# Patient Record
Sex: MALE | Race: WHITE | NOT HISPANIC OR LATINO | URBAN - METROPOLITAN AREA
[De-identification: names, ages, dates, MRNs, and addresses within clinical notes are randomized per-mention and may not be internally consistent; named-entity substitution may affect disease eponyms.]

---

## 2024-10-26 ENCOUNTER — OFFICE VISIT (OUTPATIENT)
Dept: URGENT CARE | Age: 39
End: 2024-10-26
Payer: COMMERCIAL

## 2024-10-26 ENCOUNTER — ANCILLARY PROCEDURE (OUTPATIENT)
Dept: URGENT CARE | Age: 39
End: 2024-10-26
Payer: COMMERCIAL

## 2024-10-26 VITALS
HEIGHT: 71 IN | SYSTOLIC BLOOD PRESSURE: 136 MMHG | HEART RATE: 68 BPM | RESPIRATION RATE: 20 BRPM | OXYGEN SATURATION: 99 % | WEIGHT: 155 LBS | BODY MASS INDEX: 21.7 KG/M2 | TEMPERATURE: 97.7 F | DIASTOLIC BLOOD PRESSURE: 82 MMHG

## 2024-10-26 DIAGNOSIS — S60.022A CONTUSION OF LEFT INDEX FINGER WITHOUT DAMAGE TO NAIL, INITIAL ENCOUNTER: Primary | ICD-10-CM

## 2024-10-26 DIAGNOSIS — M79.645 FINGER PAIN, LEFT: ICD-10-CM

## 2024-10-26 DIAGNOSIS — S61.211A LACERATION OF LEFT INDEX FINGER WITHOUT FOREIGN BODY WITHOUT DAMAGE TO NAIL, INITIAL ENCOUNTER: ICD-10-CM

## 2024-10-26 PROCEDURE — 73140 X-RAY EXAM OF FINGER(S): CPT | Mod: LEFT SIDE

## 2024-10-26 RX ORDER — MUPIROCIN 20 MG/G
OINTMENT TOPICAL
Qty: 22 G | Refills: 0 | Status: SHIPPED | OUTPATIENT
Start: 2024-10-26 | End: 2024-11-05

## 2024-10-26 RX ORDER — BACITRACIN ZINC 500 UNIT/G
1 OINTMENT IN PACKET (EA) TOPICAL ONCE
Status: COMPLETED | OUTPATIENT
Start: 2024-10-26 | End: 2024-10-26

## 2024-10-26 ASSESSMENT — ENCOUNTER SYMPTOMS
JOINT SWELLING: 1
WOUND: 1
COLOR CHANGE: 1
ARTHRALGIAS: 0

## 2024-10-26 ASSESSMENT — PAIN SCALES - GENERAL: PAINLEVEL_OUTOF10: 10-WORST PAIN EVER

## 2024-10-26 NOTE — PATIENT INSTRUCTIONS
RICE  NSAID/Tylenol as needed  Advised on healing time for injury  Avoid any activity that will worsen pain  Antibiotic ointment twice daily  Advisable that you update you tetanus immunization

## 2024-10-26 NOTE — PROGRESS NOTES
"Subjective   Patient ID: Jaycob Coleman is a 39 y.o. male. They present today with a chief complaint of Injury (Patient injured his left index finger 10/24/24.  It was hit with a rubber mallet.).    History of Present Illness  Subjective  Jaycob Coleman is a 39 y.o. male who presents for evaluation of left hand/finger pain. Onset was sudden, related to being struck by object. Mechanism of injury: blow. The pain is moderate, worsens with movement, and is relieved by rest. There is no associated numbness, tingling, weakness in left index finger. Evaluation to date: none. Treatment to date: OTC analgesics.    Objective  /82 (BP Location: Right arm, Patient Position: Sitting)   Pulse 68   Temp 36.5 °C (97.7 °F)   Resp 20   Ht 1.803 m (5' 11\")   Wt 70.3 kg (155 lb)   SpO2 99%   BMI 21.62 kg/m²   Right hand:  normal exam, no swelling, tenderness, instability; ligaments intact, full ROM both hands, wrists, and finger joints  Left hand:  soft tissue tenderness and swelling at the left lateral nail plate, ecchymosis of left lateral nail plate, radial pulse normal, sensation normal, and laceration of skin  lateral to nail plate    Imaging:  X-ray left hand: no fracture, dislocation, swelling or degenerative changes noted.    Assessment/Plan  Contusion left index finger.  Natural history and expected course discussed. Questions answered.  Rest, ice, compression, and elevation (RICE) therapy.  Reduction in offending activity discussed.  Plain film x-rays per orders.  OTC analgesics as needed.  Follow up in 1 week.        History provided by:  Patient   used: No    Injury      Past Medical History  Allergies as of 10/26/2024    (No Known Allergies)       (Not in a hospital admission)       No past medical history on file.    No past surgical history on file.     reports that he has been smoking cigars. He has never used smokeless tobacco.    Review of Systems  Review of Systems   Musculoskeletal:  " "Positive for joint swelling. Negative for arthralgias.   Skin:  Positive for color change and wound.   All other systems reviewed and are negative.                                 Objective    Vitals:    10/26/24 1453   BP: 136/82   BP Location: Right arm   Patient Position: Sitting   Pulse: 68   Resp: 20   Temp: 36.5 °C (97.7 °F)   SpO2: 99%   Weight: 70.3 kg (155 lb)   Height: 1.803 m (5' 11\")     No LMP for male patient.    Physical Exam  Vitals and nursing note reviewed.   Constitutional:       General: He is not in acute distress.     Appearance: Normal appearance. He is normal weight. He is not ill-appearing, toxic-appearing or diaphoretic.   Cardiovascular:      Rate and Rhythm: Normal rate.      Pulses: Normal pulses.   Pulmonary:      Effort: Pulmonary effort is normal.   Musculoskeletal:         General: Swelling, tenderness and signs of injury present. No deformity.   Skin:     General: Skin is warm.      Capillary Refill: Capillary refill takes less than 2 seconds.      Coloration: Skin is not pale.      Findings: Bruising, ecchymosis, erythema, laceration and wound present.      Comments: Healing, well-approximated 0.5 cm laceration located lateral to nail plate  of left second digit   Neurological:      General: No focal deficit present.      Mental Status: He is alert.      Sensory: No sensory deficit.         Procedures    Point of Care Test & Imaging Results from this visit  No results found for this visit on 10/26/24.   No results found.    Diagnostic study results (if any) were reviewed by CARSON Silveira.    Assessment/Plan   Allergies, medications, history, and pertinent labs/EKGs/Imaging reviewed by CARSON Silveira.     Medical Decision Making    At time of discharge patient was clinically well-appearing and HDS for outpatient management. The patient and/or family was educated regarding diagnosis, supportive care, OTC and Rx medications. The patient and/or family was given the " opportunity to ask questions prior to discharge. They verbalized understanding of my discussion of the plans for treatment, expected course, indications to return to  or seek further evaluation in ED, and the need for timely follow up as directed.     Orders and Diagnoses  Diagnoses and all orders for this visit:  Finger pain, left  -     XR fingers left 2+ views; Future      Medical Admin Record      Patient disposition: Home    Electronically signed by CARSON Silveira  3:22 PM